# Patient Record
Sex: MALE | NOT HISPANIC OR LATINO | Employment: FULL TIME | URBAN - METROPOLITAN AREA
[De-identification: names, ages, dates, MRNs, and addresses within clinical notes are randomized per-mention and may not be internally consistent; named-entity substitution may affect disease eponyms.]

---

## 2017-02-26 ENCOUNTER — HOSPITAL ENCOUNTER (EMERGENCY)
Facility: HOSPITAL | Age: 27
Discharge: HOME | End: 2017-02-26
Attending: EMERGENCY MEDICINE | Admitting: EMERGENCY MEDICINE

## 2017-02-26 VITALS
DIASTOLIC BLOOD PRESSURE: 70 MMHG | TEMPERATURE: 97.6 F | WEIGHT: 150 LBS | RESPIRATION RATE: 16 BRPM | SYSTOLIC BLOOD PRESSURE: 115 MMHG | HEIGHT: 72 IN | HEART RATE: 58 BPM | OXYGEN SATURATION: 97 % | BODY MASS INDEX: 20.32 KG/M2

## 2017-02-26 DIAGNOSIS — R07.89 ATYPICAL CHEST PAIN: ICD-10-CM

## 2017-02-26 DIAGNOSIS — R55 VASOVAGAL SYNCOPE: Primary | ICD-10-CM

## 2017-02-26 LAB
ALBUMIN SERPL-MCNC: 4.3 G/DL (ref 3.2–4.8)
ALBUMIN/GLOB SERPL: 1.7 G/DL (ref 1.5–2.5)
ALP SERPL-CCNC: 52 U/L (ref 25–100)
ALT SERPL W P-5'-P-CCNC: 12 U/L (ref 7–40)
ANION GAP SERPL CALCULATED.3IONS-SCNC: 3 MMOL/L (ref 3–11)
AST SERPL-CCNC: 15 U/L (ref 0–33)
BASOPHILS # BLD AUTO: 0.03 10*3/MM3 (ref 0–0.2)
BASOPHILS NFR BLD AUTO: 0.3 % (ref 0–1)
BILIRUB SERPL-MCNC: 0.6 MG/DL (ref 0.3–1.2)
BUN BLD-MCNC: 11 MG/DL (ref 9–23)
BUN/CREAT SERPL: 12.2 (ref 7–25)
CALCIUM SPEC-SCNC: 9.1 MG/DL (ref 8.7–10.4)
CHLORIDE SERPL-SCNC: 102 MMOL/L (ref 99–109)
CO2 SERPL-SCNC: 31 MMOL/L (ref 20–31)
CREAT BLD-MCNC: 0.9 MG/DL (ref 0.6–1.3)
DEPRECATED RDW RBC AUTO: 43.3 FL (ref 37–54)
EOSINOPHIL # BLD AUTO: 0.16 10*3/MM3 (ref 0.1–0.3)
EOSINOPHIL NFR BLD AUTO: 1.5 % (ref 0–3)
ERYTHROCYTE [DISTWIDTH] IN BLOOD BY AUTOMATED COUNT: 12.2 % (ref 11.3–14.5)
GFR SERPL CREATININE-BSD FRML MDRD: 101 ML/MIN/1.73
GLOBULIN UR ELPH-MCNC: 2.6 GM/DL
GLUCOSE BLD-MCNC: 122 MG/DL (ref 70–100)
GLUCOSE BLDC GLUCOMTR-MCNC: 114 MG/DL (ref 70–130)
HCT VFR BLD AUTO: 41.8 % (ref 38.9–50.9)
HGB BLD-MCNC: 14.3 G/DL (ref 13.1–17.5)
HOLD SPECIMEN: NORMAL
HOLD SPECIMEN: NORMAL
IMM GRANULOCYTES # BLD: 0.02 10*3/MM3 (ref 0–0.03)
IMM GRANULOCYTES NFR BLD: 0.2 % (ref 0–0.6)
LYMPHOCYTES # BLD AUTO: 2.73 10*3/MM3 (ref 0.6–4.8)
LYMPHOCYTES NFR BLD AUTO: 25 % (ref 24–44)
MAGNESIUM SERPL-MCNC: 2 MG/DL (ref 1.3–2.7)
MCH RBC QN AUTO: 32.8 PG (ref 27–31)
MCHC RBC AUTO-ENTMCNC: 34.2 G/DL (ref 32–36)
MCV RBC AUTO: 95.9 FL (ref 80–99)
MONOCYTES # BLD AUTO: 0.85 10*3/MM3 (ref 0–1)
MONOCYTES NFR BLD AUTO: 7.8 % (ref 0–12)
NEUTROPHILS # BLD AUTO: 7.14 10*3/MM3 (ref 1.5–8.3)
NEUTROPHILS NFR BLD AUTO: 65.2 % (ref 41–71)
PLATELET # BLD AUTO: 182 10*3/MM3 (ref 150–450)
PMV BLD AUTO: 9.5 FL (ref 6–12)
POTASSIUM BLD-SCNC: 3.5 MMOL/L (ref 3.5–5.5)
PROT SERPL-MCNC: 6.9 G/DL (ref 5.7–8.2)
RBC # BLD AUTO: 4.36 10*6/MM3 (ref 4.2–5.76)
SODIUM BLD-SCNC: 136 MMOL/L (ref 132–146)
TROPONIN I SERPL-MCNC: 0.06 NG/ML (ref 0–0.07)
TROPONIN I SERPL-MCNC: 0.07 NG/ML (ref 0–0.07)
WBC NRBC COR # BLD: 10.93 10*3/MM3 (ref 3.5–10.8)
WHOLE BLOOD HOLD SPECIMEN: NORMAL
WHOLE BLOOD HOLD SPECIMEN: NORMAL

## 2017-02-26 PROCEDURE — 83735 ASSAY OF MAGNESIUM: CPT | Performed by: EMERGENCY MEDICINE

## 2017-02-26 PROCEDURE — 96374 THER/PROPH/DIAG INJ IV PUSH: CPT

## 2017-02-26 PROCEDURE — 25010000002 KETOROLAC TROMETHAMINE PER 15 MG: Performed by: EMERGENCY MEDICINE

## 2017-02-26 PROCEDURE — 82962 GLUCOSE BLOOD TEST: CPT

## 2017-02-26 PROCEDURE — 93005 ELECTROCARDIOGRAM TRACING: CPT | Performed by: EMERGENCY MEDICINE

## 2017-02-26 PROCEDURE — 80053 COMPREHEN METABOLIC PANEL: CPT | Performed by: EMERGENCY MEDICINE

## 2017-02-26 PROCEDURE — 85025 COMPLETE CBC W/AUTO DIFF WBC: CPT | Performed by: EMERGENCY MEDICINE

## 2017-02-26 PROCEDURE — 96361 HYDRATE IV INFUSION ADD-ON: CPT

## 2017-02-26 PROCEDURE — 84484 ASSAY OF TROPONIN QUANT: CPT

## 2017-02-26 PROCEDURE — 99285 EMERGENCY DEPT VISIT HI MDM: CPT

## 2017-02-26 RX ORDER — SODIUM CHLORIDE 0.9 % (FLUSH) 0.9 %
10 SYRINGE (ML) INJECTION AS NEEDED
Status: DISCONTINUED | OUTPATIENT
Start: 2017-02-26 | End: 2017-02-26 | Stop reason: HOSPADM

## 2017-02-26 RX ORDER — KETOROLAC TROMETHAMINE 15 MG/ML
15 INJECTION, SOLUTION INTRAMUSCULAR; INTRAVENOUS ONCE
Status: COMPLETED | OUTPATIENT
Start: 2017-02-26 | End: 2017-02-26

## 2017-02-26 RX ADMIN — KETOROLAC TROMETHAMINE 15 MG: 15 INJECTION, SOLUTION INTRAMUSCULAR; INTRAVENOUS at 06:24

## 2017-02-26 RX ADMIN — SODIUM CHLORIDE 1000 ML: 9 INJECTION, SOLUTION INTRAVENOUS at 06:22

## 2018-07-05 ENCOUNTER — HOSPITAL ENCOUNTER (EMERGENCY)
Facility: HOSPITAL | Age: 28
Discharge: HOME | End: 2018-07-05
Attending: EMERGENCY MEDICINE | Admitting: EMERGENCY MEDICINE

## 2018-07-05 VITALS
SYSTOLIC BLOOD PRESSURE: 140 MMHG | TEMPERATURE: 97.8 F | WEIGHT: 150 LBS | RESPIRATION RATE: 16 BRPM | HEART RATE: 92 BPM | HEIGHT: 72 IN | OXYGEN SATURATION: 97 % | BODY MASS INDEX: 20.32 KG/M2 | DIASTOLIC BLOOD PRESSURE: 73 MMHG

## 2018-07-05 DIAGNOSIS — L03.90 CELLULITIS, UNSPECIFIED CELLULITIS SITE: Primary | ICD-10-CM

## 2018-07-05 PROCEDURE — 99283 EMERGENCY DEPT VISIT LOW MDM: CPT

## 2018-07-05 RX ORDER — DOXYCYCLINE 100 MG/1
100 CAPSULE ORAL 2 TIMES DAILY
Qty: 20 CAPSULE | Refills: 0 | Status: SHIPPED | OUTPATIENT
Start: 2018-07-05 | End: 2018-07-15

## 2018-07-05 NOTE — DISCHARGE INSTRUCTIONS
Clean once daily with antibacterial soap.  Follow-up with below stated doctor or your doctor if no improvement in 2 days.

## 2018-07-05 NOTE — ED PROVIDER NOTES
Subjective   28-year-old white male complaining of insect bites.  Patient states that for the past 3 days he has noticed small dots on his lower extremities.  The one he is concerned about is near his inguinal area.  This particular area has more erythema and is a little more tender than the others.  Patient denies any tick bites he does admit being outdoors before this occurred.  Patient denies any fever, chills, other rash or other complaints.        Rash   Location:  Leg  Leg rash location:  R upper leg  Quality: itchiness    Onset quality:  Sudden  Timing:  Constant  Chronicity:  New  Context: insect bite/sting    Relieved by:  Nothing  Worsened by:  Nothing  Ineffective treatments:  Antibiotic cream  Associated symptoms: no fever, no headaches and no myalgias        Review of Systems   Constitutional: Negative for fever.   Musculoskeletal: Negative for myalgias.   Skin: Positive for rash.   Neurological: Negative for headaches.   All other systems reviewed and are negative.      History reviewed. No pertinent past medical history.    Allergies   Allergen Reactions   • Amoxicillin    • Ceclor [Cefaclor]    • Ciprofloxacin    • Penicillins        Past Surgical History:   Procedure Laterality Date   • ARM LACERATION REPAIR Right    • EYE SURGERY         History reviewed. No pertinent family history.    Social History     Social History   • Marital status: Single     Social History Main Topics   • Smoking status: Current Every Day Smoker     Packs/day: 1.00   • Alcohol use Yes      Comment: rarely   • Drug use: No     Other Topics Concern   • Not on file           Objective   Physical Exam   Constitutional: He appears well-developed and well-nourished.   Eyes: Conjunctivae are normal.   Pulmonary/Chest: Effort normal. He has no wheezes.   Neurological: He is alert.   Skin: Capillary refill takes less than 2 seconds.   1 cm circular lesion with ring appearance. This is located at the inner right thigh.    The lower  extremities otherwise show small erythematous papular lesions numbering approximately 5 on the right left lower extremity.  The remainder of the skin including the back normal.   Psychiatric: He has a normal mood and affect. His behavior is normal.   Nursing note and vitals reviewed.      Procedures           ED Course                  MDM      Final diagnoses:   Cellulitis, unspecified cellulitis site            MERON Crystal  07/05/18 1247

## 2019-04-10 ENCOUNTER — HOSPITAL ENCOUNTER (EMERGENCY)
Facility: HOSPITAL | Age: 29
Discharge: HOME | End: 2019-04-10
Attending: EMERGENCY MEDICINE | Admitting: EMERGENCY MEDICINE

## 2019-04-10 VITALS
WEIGHT: 150 LBS | RESPIRATION RATE: 16 BRPM | HEART RATE: 69 BPM | OXYGEN SATURATION: 100 % | DIASTOLIC BLOOD PRESSURE: 84 MMHG | HEIGHT: 72 IN | TEMPERATURE: 98 F | BODY MASS INDEX: 20.32 KG/M2 | SYSTOLIC BLOOD PRESSURE: 130 MMHG

## 2019-04-10 DIAGNOSIS — S80.01XA CONTUSION OF RIGHT KNEE, INITIAL ENCOUNTER: Primary | ICD-10-CM

## 2019-04-10 PROCEDURE — 99283 EMERGENCY DEPT VISIT LOW MDM: CPT

## 2019-04-10 RX ORDER — TRAMADOL HYDROCHLORIDE 50 MG/1
50 TABLET ORAL EVERY 8 HOURS PRN
Qty: 10 TABLET | Refills: 0 | Status: SHIPPED | OUTPATIENT
Start: 2019-04-10 | End: 2019-04-26

## 2019-04-10 NOTE — ED PROVIDER NOTES
Subjective   Fazal Munoz is a 29 y.o.male who presents to the ED with complaints of right knee pain. The patient reports he injured his right knee while he was skateboarding last night. He has not taken any medication for his pain. He denies sustaining any other injuries during the fall. Additionally, he has been ambulatory since the fall. Furthermore, he was evaluated at the urgent treatment center earlier today before he was sent here for further evaluation. There are no other complaints at this time.         History provided by:  Patient  Lower Extremity Issue   Location:  Knee  Time since incident:  1 day  Injury: yes    Mechanism of injury: fall    Fall:     Fall occurred: skateboarding.    Impact surface:  Hard floor    Entrapped after fall: no    Knee location:  R knee  Pain details:     Quality:  Aching    Radiates to:  Does not radiate    Severity:  Moderate    Onset quality:  Sudden    Duration:  1 day    Timing:  Constant    Progression:  Unchanged  Chronicity:  New  Dislocation: no    Foreign body present:  No foreign bodies  Prior injury to area:  No  Relieved by:  None tried  Worsened by:  Nothing  Ineffective treatments:  None tried  Associated symptoms: no back pain and no neck pain        Review of Systems   Cardiovascular: Negative for chest pain.   Gastrointestinal: Negative for abdominal pain.   Musculoskeletal: Positive for arthralgias (right knee). Negative for back pain and neck pain.   Neurological: Negative for headaches.   All other systems reviewed and are negative.      History reviewed. No pertinent past medical history.    Allergies   Allergen Reactions   • Amoxicillin    • Ceclor [Cefaclor]    • Ciprofloxacin    • Penicillins        Past Surgical History:   Procedure Laterality Date   • ARM LACERATION REPAIR Right    • EYE SURGERY         History reviewed. No pertinent family history.    Social History     Socioeconomic History   • Marital status: Single     Spouse name: Not on  file   • Number of children: Not on file   • Years of education: Not on file   • Highest education level: Not on file   Tobacco Use   • Smoking status: Current Every Day Smoker     Packs/day: 1.00   Substance and Sexual Activity   • Alcohol use: Yes     Comment: rarely   • Drug use: Yes     Types: Marijuana     Comment: smoked today   • Sexual activity: Defer         Objective   Physical Exam   Constitutional: He is oriented to person, place, and time. He appears well-developed and well-nourished. No distress.   HENT:   Head: Normocephalic and atraumatic.   Nose: Nose normal.   Eyes: Conjunctivae are normal. No scleral icterus.   Neck: Normal range of motion. Neck supple.   Pulmonary/Chest: Effort normal. No respiratory distress.   Musculoskeletal: Normal range of motion. He exhibits edema.   Soft tissue swelling over the proximal tibial plateau. Range of motion of the knee is full, but it reproduces his presenting pain. No laxity x4. Proximal and distal joints are negative.    Neurological: He is alert and oriented to person, place, and time.   Skin: Skin is warm and dry. No erythema.   Psychiatric: He has a normal mood and affect. His behavior is normal.   Nursing note and vitals reviewed.      Procedures         ED Course  ED Course as of Apr 10 1609   Wed Apr 10, 2019   1556  radiology reading from 1144 this morning showing a negative x-ray of the right knee for any acute findings.   [TB]      ED Course User Index  [TB] Og Montero     No results found for this or any previous visit (from the past 24 hour(s)).  Note: In addition to lab results from this visit, the labs listed above may include labs taken at another facility or during a different encounter within the last 24 hours. Please correlate lab times with ED admission and discharge times for further clarification of the services performed during this visit.    No orders to display     Vitals:    04/10/19 1453 04/10/19 1609   BP: 143/80 130/84  "  BP Location: Left arm    Patient Position: Sitting    Pulse: 80 69   Resp: 14 16   Temp: 98 °F (36.7 °C)    TempSrc: Oral    SpO2: 96% 100%   Weight: 68 kg (150 lb)    Height: 182.9 cm (72\")      Medications - No data to display  ECG/EMG Results (last 24 hours)     ** No results found for the last 24 hours. **        No orders to display                       MDM    Final diagnoses:   Contusion of right knee, initial encounter       Documentation assistance provided by brianna Montero.  Information recorded by the brianna was done at my direction and has been verified and validated by me.     Og Montero  04/10/19 1609       Emily Nguyen APRN  04/10/19 1610    "

## 2019-04-10 NOTE — DISCHARGE INSTRUCTIONS
Use the Ace wrap and crutches without exception for 3 full days, starting today.  Check your ability to function at baseline without the crutches on Sunday morning.  If pain persist, follow-up with the orthopedic office of Dr. Farris whose name, address and phone number is provided.  Use Motrin 3 times a day for 3 days then just as needed.  Use the prescription pain medication for that pain at breakthrough Motrin.  Use ice every 2 hours for 20 minutes during your waking hours.  Thank you

## 2019-04-26 ENCOUNTER — OFFICE VISIT (OUTPATIENT)
Dept: ORTHOPEDIC SURGERY | Facility: CLINIC | Age: 29
End: 2019-04-26

## 2019-04-26 VITALS — BODY MASS INDEX: 20.31 KG/M2 | HEART RATE: 108 BPM | WEIGHT: 149.91 LBS | OXYGEN SATURATION: 98 % | HEIGHT: 72 IN

## 2019-04-26 DIAGNOSIS — S83.521A: ICD-10-CM

## 2019-04-26 DIAGNOSIS — S80.01XA CONTUSION OF RIGHT KNEE, INITIAL ENCOUNTER: Primary | ICD-10-CM

## 2019-04-26 PROCEDURE — 99203 OFFICE O/P NEW LOW 30 MIN: CPT | Performed by: ORTHOPAEDIC SURGERY

## 2019-04-26 NOTE — PROGRESS NOTES
[facility] [place] [facility] Note    Subjective     Chief Complaint   Patient presents with   • Right Knee - Pain     Contusion of Right Knee        HPI      Fazal Munoz is a 29 y.o. male.  He fell on his right knee skating on April 16.  He landed directly on his right anterior tibia.  Pain is 6 out of 10.  He tried ibuprofen.  His knee feels loose and unstable.        No past medical history on file.   Past Surgical History:   Procedure Laterality Date   • ARM LACERATION REPAIR Right    • EYE SURGERY        No family history on file.  Social History     Socioeconomic History   • Marital status: Single     Spouse name: Not on file   • Number of children: Not on file   • Years of education: Not on file   • Highest education level: Not on file   Tobacco Use   • Smoking status: Current Every Day Smoker     Packs/day: 1.00   Substance and Sexual Activity   • Alcohol use: Yes     Comment: rarely   • Drug use: Yes     Types: Marijuana     Comment: smoked today   • Sexual activity: Defer      Current Outpatient Medications on File Prior to Visit   Medication Sig Dispense Refill   • [DISCONTINUED] traMADol (ULTRAM) 50 MG tablet Take 1 tablet by mouth Every 8 (Eight) Hours As Needed for Moderate Pain . 10 tablet 0     No current facility-administered medications on file prior to visit.       Allergies   Allergen Reactions   • Amoxicillin    • Ceclor [Cefaclor]    • Ciprofloxacin    • Penicillins         The following portions of the patient's history were reviewed and updated as appropriate: allergies, current medications, past family history, past medical history, past social history, past surgical history and problem list.    Review of Systems   Constitutional: Negative.    HENT: Negative.    Eyes: Negative.    Respiratory: Negative.    Cardiovascular: Negative.    Gastrointestinal: Negative.    Endocrine: Negative.    Genitourinary: Negative.    Musculoskeletal: Positive for joint swelling.        Joint Pain   "  Skin: Negative.    Allergic/Immunologic: Negative.    Neurological: Negative.    Hematological: Negative.    Psychiatric/Behavioral: Negative.         Objective      Physical Exam  Pulse 108   Ht 182.9 cm (72.01\")   Wt 68 kg (149 lb 14.6 oz)   SpO2 98%   BMI 20.33 kg/m²     Body mass index is 20.33 kg/m².        GENERAL APPEARANCE: awake, alert & oriented x 3, in no acute distress and well developed, well nourished  PSYCH: normal mood and affect  LUNGS:  breathing nonlabored, no wheezing  EYES: sclera anicteric, pupils equal  CARDIOVASCULAR: palpable pulses dorsalis pedis, palpable posterior tibial bilaterally. Capillary refill less than 2 seconds  INTEGUMENTARY: skin intact, no clubbing, cyanosis  NEUROLOGIC:  Normal Sensation and reflexes             Ortho Exam  Peripheral Vascular:    Upper Extremity:   Inspection:  Left--no cyanotic nail beds Right--no cyanotic nail beds   Bilateral:  Pink nail beds with brisk capillary refill   Palpation:  Bilateral radial pulse normal  Musculoskeletal:  Global Assessment:  Overall assessment of Lower Extremity Muscle Strength and Tone:  Right quadriceps--5/5  Right hamstrings--5/5  Right tibialis anterior--5/5  Right gastroc soleus--5/5  Right EHL--5/5  Lower Extremity:  Knee/Patella:  No digital clubbing or cyanosis.    Examination of right knee reveals:  Normal deep tendon reflexes, coordination, strength, tone, sensation.  No known fractures or deformities.  Inspection and Palpation:    Right knee:  Tenderness:  none  Effusion: Trace  Crepitus:  none  Pulses:  2+  Ecchymosis:  None  Warmth:  None   ROM:  Right:  Extension:0    Flexion:135  Left:  Extension:0     Flexion:135  Instability:  Right:  Lachman Test:  Negative, Varus stress test negative,   Valgus stress test negative, Posterior Drawer Test: Positive and 1+ deformities/Malalignments/Discrepancies:    Left:  none  Right:  none  Functional Testing:  Right:  Mali's test:  Negative  Patella grind test:  " Negative  Q-angle:  Normal  Apprehension Sign:  Negative        Imaging/Studies  Imaging Results (last 7 days)     Procedure Component Value Units Date/Time    XR Knee 3+ View With [place] Right [[id number]] Resulted:  04/26/19 0933     Updated:  04/26/19 0934    Narrative:       Knee X-Ray  Indication: Pain    Upright AP of bilateral knees. Lateral, skiers and Sunrise views of right   knee     Findings:  No fracture  No bony lesion  Normal soft tissues  Normal joint spaces    No prior studies were available for comparison.            Assessment/Plan        ICD-10-CM ICD-9-CM   1. Contusion of right knee, initial encounter S80.01XA 924.11   2. Tear of knee, posterior cruciate ligament, right, initial encounter S83.521A 844.2       Orders Placed This Encounter   Procedures   • XR Knee 3+ View With [place] Right   • MRI Knee Right Without Contrast      The plan will be a knee brace and an MRI to evaluate the severity of his posterior cruciate ligament tear.  I will see him back after the MRI.    Medical Decision Making  Management Options : over-the-counter medicine  Data/Risk: radiology tests and independent visualization of imaging, lab tests, or EMG/NCV    Wilfredo Farris MD  04/26/19  9:39 AM         EMR Dragon/Transcription disclaimer:  Much of this encounter note is an electronic transcription of spoken language to printed text. Electronic transcription of spoken language may permit erroneous, or at times, nonsensical words or phrases to be inadvertently transcribed. Although I have reviewed the note for such errors, some may still exist.

## 2019-05-01 ENCOUNTER — APPOINTMENT (OUTPATIENT)
Dept: MRI IMAGING | Facility: HOSPITAL | Age: 29
End: 2019-05-01

## 2019-05-01 ENCOUNTER — HOSPITAL ENCOUNTER (OUTPATIENT)
Dept: MRI IMAGING | Facility: HOSPITAL | Age: 29
Discharge: HOME | End: 2019-05-01
Admitting: ORTHOPAEDIC SURGERY

## 2019-05-01 DIAGNOSIS — S83.521A: ICD-10-CM

## 2019-05-01 PROCEDURE — 73721 MRI JNT OF LWR EXTRE W/O DYE: CPT

## 2019-05-10 ENCOUNTER — OFFICE VISIT (OUTPATIENT)
Dept: ORTHOPEDIC SURGERY | Facility: CLINIC | Age: 29
End: 2019-05-10

## 2019-05-10 VITALS — WEIGHT: 160.05 LBS | HEART RATE: 102 BPM | BODY MASS INDEX: 21.68 KG/M2 | HEIGHT: 72 IN | OXYGEN SATURATION: 99 %

## 2019-05-10 DIAGNOSIS — S83.521D RUPTURE OF POSTERIOR CRUCIATE LIGAMENT OF RIGHT KNEE, SUBSEQUENT ENCOUNTER: Primary | ICD-10-CM

## 2019-05-10 PROCEDURE — 99213 OFFICE O/P EST LOW 20 MIN: CPT | Performed by: ORTHOPAEDIC SURGERY

## 2019-05-10 NOTE — PROGRESS NOTES
[facility] [place] [facility] [place]    Subjective     Chief Complaint   Patient presents with   • Follow-up     MRI Right Knee 5/1/19        HPI      Fazal Munoz is a 29 y.o. male.  He is follow-up after the MRI of the right knee.  He injured his knee April 16.  He is in line skater.  Pain is 4 out of 10.        History reviewed. No pertinent past medical history.   Past Surgical History:   Procedure Laterality Date   • ARM LACERATION REPAIR Right    • EYE SURGERY        History reviewed. No pertinent family history.  Social History     Socioeconomic History   • Marital status: Single     Spouse name: Not on file   • Number of children: Not on file   • Years of education: Not on file   • Highest education level: Not on file   Tobacco Use   • Smoking status: Current Every Day Smoker     Packs/day: 1.00   Substance and Sexual Activity   • Alcohol use: Yes     Comment: rarely   • Drug use: Yes     Types: Marijuana     Comment: smoked today   • Sexual activity: Defer      No current outpatient medications on file prior to visit.     No current facility-administered medications on file prior to visit.       Allergies   Allergen Reactions   • Amoxicillin    • Ceclor [Cefaclor]    • Ciprofloxacin    • Penicillins         The following portions of the patient's history were reviewed and updated as appropriate: allergies, current medications, past family history, past medical history, past social history, past surgical history and problem list.    Review of Systems   Constitutional: Negative.    HENT: Negative.    Eyes: Negative.    Respiratory: Negative.    Cardiovascular: Negative.    Gastrointestinal: Negative.    Endocrine: Negative.    Genitourinary: Negative.    Musculoskeletal: Positive for arthralgias (knee pain).   Skin: Negative.    Allergic/Immunologic: Negative.    Neurological: Negative.    Hematological: Negative.    Psychiatric/Behavioral: Negative.         Objective      Physical Exam  Pulse 102    "Ht 182.9 cm (72.01\")   Wt 72.6 kg (160 lb 0.9 oz)   SpO2 99%   BMI 21.70 kg/m²     Body mass index is 21.7 kg/m².        GENERAL APPEARANCE: awake, alert & oriented x 3, in no acute distress and well developed, well nourished  PSYCH: normal mood and affect  LUNGS:  breathing nonlabored, no wheezing      Ortho Exam  Peripheral Vascular:    Upper Extremity:   Inspection:  Left--no cyanotic nail beds Right--no cyanotic nail beds   Bilateral:  Pink nail beds with brisk capillary refill   Palpation:  Bilateral radial pulse normal  Musculoskeletal:  Global Assessment:  Overall assessment of Lower Extremity Muscle Strength and Tone:  Right quadriceps--5/5  Right hamstrings--5/5  Right tibialis anterior--5/5  Right gastroc soleus--5/5  Right EHL--5/5  Lower Extremity:  Knee/Patella:  No digital clubbing or cyanosis.    Examination of right knee reveals:  Normal deep tendon reflexes, coordination, strength, tone, sensation.  No known fractures or deformities.  Inspection and Palpation:    Right knee:  Tenderness:  none  Effusion:  none  Crepitus:  none  Pulses:  2+  Ecchymosis:  None  Warmth:  None   ROM:  Right:  Extension:0    Flexion:135  Left:  Extension:0     Flexion:135  Instability:  Right:  Lachman Test:  Negative, Varus stress test negative,   Valgus stress test negative, Posterior Drawer Test: Positive  Deformities/Malalignments/Discrepancies:    Left:  none  Right:  none  Functional Testing:  Right:  Mali's test:  Negative  Patella grind test:  Negative  Q-angle:  Normal  Apprehension Sign:  Negative        Imaging/Studies  Imaging Results (last 7 days)     ** No results found for the last 168 hours. **      I viewed his MRI from May 1 which shows a complete PCL tear with joint effusion.    Assessment/Plan        ICD-10-CM ICD-9-CM   1. Rupture of posterior cruciate ligament of right knee, subsequent encounter S83.521D V58.89     844.2       Orders Placed This Encounter   Procedures   • Ambulatory Referral " to Physical Therapy      Plan to be physical therapy and we will get him a PCL brace.  He will continue hinged knee sleeve in the meantime.  I will see him back in a month.  We went over his restrictions and treatment plan.    Medical Decision Making  Management Options : over-the-counter medicine and physical/occupational therapy  Data/Risk: radiology tests and independent visualization of imaging, lab tests, or EMG/NCV    Wilfredo Farris MD  05/10/19  9:13 AM         EMR Dragon/Transcription disclaimer:  Much of this encounter note is an electronic transcription of spoken language to printed text. Electronic transcription of spoken language may permit erroneous, or at times, nonsensical words or phrases to be inadvertently transcribed. Although I have reviewed the note for such errors, some may still exist.

## 2019-05-23 ENCOUNTER — HOSPITAL ENCOUNTER (OUTPATIENT)
Dept: PHYSICAL THERAPY | Facility: HOSPITAL | Age: 29
Setting detail: THERAPIES SERIES
Discharge: HOME | End: 2019-05-23

## 2019-05-23 DIAGNOSIS — S83.521A RUPTURE OF POSTERIOR CRUCIATE LIGAMENT OF RIGHT KNEE, INITIAL ENCOUNTER: Primary | ICD-10-CM

## 2019-05-23 PROCEDURE — 97161 PT EVAL LOW COMPLEX 20 MIN: CPT | Performed by: PHYSICAL THERAPIST

## 2019-05-23 NOTE — THERAPY EVALUATION
Outpatient Physical Therapy Ortho Initial Evaluation  [facility]     Patient Name: Fazal Munoz  : 1990  MRN: [id number]  Today's Date: 2019      Visit Date: 2019    There is no problem list on file for this patient.       History reviewed. No pertinent past medical history.     Past Surgical History:   Procedure Laterality Date   • ARM LACERATION REPAIR Right    • EYE SURGERY         Visit Dx:     ICD-10-CM ICD-9-CM   1. Rupture of posterior cruciate ligament of right knee, initial encounter S83.521A 844.2         Patient History     Row Name 19 1300             History    Chief Complaint  Difficulty with daily activities;Pain;Joint swelling  -RB      Type of Pain  Knee pain  -RB      Brief Description of Current Complaint  Pt reports that he injured his R knee while skating on . He landed directly on his R knee with his knee in a bent position. He felt a pop, had immediate pn and difficulty WBing. Had MRI showing PCL rupture. Over the past couple of weeks his pn, swelling, and mobility have greatly improved. However he continues to have some tightness in the knee limiting full flexion, and has not yet returned to skating. His goal for PT is to return to performing skating tricks, which includes deep single leg squats. He also notes mild difficulty w/ descending stairs.  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 Hobbies include video games and skating.  -RB      How has patient tried to help current problem?  rest, ice  -RB      What clinical tests have you had for this problem?  MRI  -RB      Results of Clinical Tests  rupture of PCL  -RB          Pain     Pain Location  Knee  -RB      Pain at Present  0  -RB      Pain at Best  0  -RB      Pain at Worst  1  -RB      Pain Frequency  Intermittent  -RB      Pain Description  Shooting;Sore  -RB      What Performance Factors Make the Current Problem(s) WORSE?  deep knee flexion, squatting  -RB      What Performance Factors Make the Current Problem(s) BETTER?  rest, ice  -RB      Is your sleep disturbed?  No  -RB         Fall Risk Assessment    Any falls in the past year:  Yes  -RB      Number of falls reported in the last 12 months  1  -RB      Factors that contributed to the fall:  Tripped  -RB         Daily Activities    Primary Language  English  -RB      How does patient learn best?  Demonstration  -RB      Teaching needs identified  Home Exercise Program;Management of Condition  -RB      Patient is concerned about/has problems with  Flexibility;Performing home management (household chores, shopping, care of dependents);Performing job responsibilities/community activities (work, school,;Performing sports, recreation, and play activities  -RB      Does patient have problems with the following?  None  -RB      Barriers to learning  None  -RB      Pt Participated in POC and Goals  Yes  -RB         Safety    Are you being hurt, hit, or frightened by anyone at home or in your life?  No  -RB      Are you being neglected by a caregiver  No  -RB        User Key  (r) = Recorded By, (t) = Taken By, (c) = Cosigned By    Initials Name Provider Type    RB Silvia Kraft PT Physical Therapist          PT Ortho     Row Name 05/23/19 1300       Posture/Observations    Posture/Observations Comments  Pt ambulates independently and w/o apparent deviation. No edema noted. He navigates stairs reciprocally and w/o deviation but does subjectively note mild instability w/ descending.  -RB       Special Tests/Palpation    Special Tests/Palpation  Knee  -RB       Knee Palpation    Knee Palpation?  Yes  -RB    Posterior Joint Line   Right:;Tender  -RB       Knee (Tibiofemoral) Accessory Motions    Knee (Tibiofemoral) Accessory Motions Tested?  Yes  -RB    Posterior glide of tibia on femur  Right:;Hypermobile  -RB       Knee Special Tests    Anterior drawer (ACL lesion)  Negative  -RB    Posterior drawer (PCL lesion)  Right:;Positive  -RB    Valgus stress (MCL lesion)  Negative  -RB    Varus stress (LCL lesion)  Negative  -RB    Thessaly test (meniscal lesion)  Negative  -RB    Knee Special Tests Comments  Pn w/ deep squat. Peforms eccentric step down test w/ minimal deviation/lack eccentric control. Reports instability/weakness with single leg RDL on R.  -RB       General ROM    RT Lower Ext  Rt Knee Extension/Flexion  -RB    LT Lower Ext  Lt Knee Extension/Flexion  -RB       Right Lower Ext    Rt Knee Extension/Flexion AROM  2-0-140  -RB       Left Lower Ext    Lt Knee Extension/Flexion AROM  2-0-150  -RB       MMT (Manual Muscle Testing)    Rt Lower Ext  Rt Hip Flexion;Rt Hip Extension;Rt Hip ABduction;Rt Knee Extension;Rt Knee Flexion  -RB    Lt Lower Ext  Lt Hip Flexion;Lt Hip Extension;Lt Hip ABduction;Lt Knee Extension;Lt Knee Flexion  -RB       MMT Right Lower Ext    Rt Hip Flexion MMT, Gross Movement  (5/5) normal  -RB    Rt Hip Extension MMT, Gross Movement  (5/5) normal  -RB    Rt Hip ABduction MMT, Gross Movement  (5/5) normal  -RB    Rt Knee Extension MMT, Gross Movement  (5/5) normal  -RB    Rt Knee Flexion MMT, Gross Movement  (5/5) normal mild discomfort post knee  -RB       MMT Left Lower Ext    Lt Hip Flexion MMT, Gross Movement  (5/5) normal  -RB    Lt Hip Extension MMT, Gross Movement  (5/5) normal  -RB    Lt Hip ABduction MMT, Gross Movement  (5/5) normal  -RB    Lt Knee Extension MMT, Gross Movement  (5/5) normal  -RB    Lt Knee Flexion MMT, Gross Movement  (5/5) normal  -RB       Balance Skills Training    SLS  intact  -RB      User Key  (r) = Recorded By, (t) = Taken By, (c) = Cosigned By    Initials Name Provider Type     Silvia Duenas PT Physical Therapist                      Therapy Education  Education Details: issued initial HEP including front lunge w/ trunk rotation, multidirectional lunges; discussed avoiding skating tricks or high impact activity at this time, continued use of ice for pn/swelling  Given: HEP, Pain management  Program: New  How Provided: Verbal, Demonstration, Written  Provided to: Patient  Level of Understanding: Teach back education performed     PT OP Goals     Row Name 05/23/19 1443 05/23/19 1400       PT Short Term Goals    STG Date to Achieve  --  07/04/19  -RB    STG 1  --  Pt to be independent w/ long term HEP and self mgmt  -RB    STG 1 Progress  --  New  -RB    STG 2  --  Pt to report no pn or limitations w/ work duties.  -RB    STG 2 Progress  --  New  -RB    STG 3  --  Pt to demo R knee flxn ROM of 150 deg.  -RB    STG 3 Progress  --  New  -RB    STG 4  --  Pt to perform single leg squat through full ROM w/o pn.  -RB    STG 4 Progress  --  New  -RB    STG 5  --  Pt to improve LEFS score to 63/80 to reflect improved pn and function.  -RB    STG 5 Progress  --  New  -RB       Time Calculation    PT Goal Re-Cert Due Date  08/21/19  -RB  08/21/19  -RB      User Key  (r) = Recorded By, (t) = Taken By, (c) = Cosigned By    Initials Name Provider Type    RB Silvia Kraft, PT Physical Therapist          PT Assessment/Plan     Row Name 05/23/19 1440          PT Assessment    Functional Limitations  Impaired locomotion;Limitation in home management;Limitations in community activities;Limitations in functional capacity and performance;Performance in leisure activities;Performance in sport activities;Performance in work activities;Performance in self-care ADL  -RB     Impairments  Muscle strength;Range of motion;Pain;Joint integrity  -RB     Assessment Comments  Pt presents w/ evolving symptoms of low complexity consistent w/ diagnosis of R PCL tear. He demonstrates mild deficits in R knee flxn ROM,  joint stability, and strength limiting his ability to perform daily and work activities.  He would benefit from skilled PT services to address deficits and return to PLOF.  -RB     Please refer to paper survey for additional self-reported information  Yes  -RB     Rehab Potential  Excellent  -RB     Patient/caregiver participated in establishment of treatment plan and goals  Yes  -RB     Patient would benefit from skilled therapy intervention  Yes  -RB        PT Plan    PT Frequency  1x/week  -RB     Predicted Duration of Therapy Intervention (Therapy Eval)  6-8 visits  -RB     Planned CPT's?  PT EVAL LOW COMPLEXITY: 81749;PT RE-EVAL: 88116;PT THER PROC EA 15 MIN: 63222;PT MANUAL THERAPY EA 15 MIN: 96466;PT NEUROMUSC RE-EDUCATION EA 15 MIN: 03308;PT HOT/COLD PACK WC NONMCARE: 38863;PT ELECTRICAL STIM UNATTEND:   -RB     PT Plan Comments  PT POC to include higher level hip and knee strengthening w/ progression to single leg and plyometric activities as tolerated, manual therapy techniques, modalities as indicated for pn control.  -RB       User Key  (r) = Recorded By, (t) = Taken By, (c) = Cosigned By    Initials Name Provider Type    RB Silvia Kraft, PT Physical Therapist                              Outcome Measure Options: Lower Extremity Functional Scale (LEFS)  Lower Extremity Functional Index  Any of your usual work, housework or school activities: Moderate difficulty  Your usual hobbies, recreational or sporting activities: Quite a bit of difficulty  Getting into or out of the bath: No difficulty  Walking between rooms: No difficulty  Putting on your shoes or socks: A little bit of difficulty  Squatting: Quite a bit of difficulty  Lifting an object, like a bag of groceries from the floor: No difficulty  Performing light activities around your home: No difficulty  Performing heavy activities around your home: A little bit of difficulty  Getting into or out of a car: No difficulty  Walking 2 blocks: No  difficulty  Walking a mile: A little bit of difficulty  Going up or down 10 stairs (about 1 flight of stairs): A little bit of difficulty  Standing for 1 hour: A little bit of difficulty  Sitting for 1 hour: No difficulty  Running on even ground: Quite a bit of difficulty  Running on uneven ground: Quite a bit of difficulty  Making sharp turns while running fast: Quite a bit of difficulty  Hopping: Quite a bit of difficulty  Rolling over in bed: No difficulty  Total: 55      Time Calculation:     Start Time: 1345     Therapy Charges for Today     Code Description Service Date Service Provider Modifiers Qty    37628427651 HC PT EVAL LOW COMPLEXITY 3 5/23/2019 Silvia Kraft, PT GP 1          PT G-Codes  Outcome Measure Options: Lower Extremity Functional Scale (LEFS)  Total: 55         Silvia Kraft, PT  5/23/2019

## 2019-05-30 ENCOUNTER — HOSPITAL ENCOUNTER (OUTPATIENT)
Dept: PHYSICAL THERAPY | Facility: HOSPITAL | Age: 29
Setting detail: THERAPIES SERIES
Discharge: HOME | End: 2019-05-30

## 2019-05-30 DIAGNOSIS — S83.521A RUPTURE OF POSTERIOR CRUCIATE LIGAMENT OF RIGHT KNEE, INITIAL ENCOUNTER: Primary | ICD-10-CM

## 2019-05-30 PROCEDURE — 97110 THERAPEUTIC EXERCISES: CPT | Performed by: PHYSICAL THERAPIST

## 2019-05-30 NOTE — THERAPY TREATMENT NOTE
Outpatient Physical Therapy Ortho Treatment Note  [facility]     Patient Name: Fazal Munoz  : 1990  MRN: [id number]  Today's Date: 2019      Visit Date: 2019    Visit Dx:    ICD-10-CM ICD-9-CM   1. Rupture of posterior cruciate ligament of right knee, initial encounter S83.521A 844.2       There is no problem list on file for this patient.       No past medical history on file.     Past Surgical History:   Procedure Laterality Date   • ARM LACERATION REPAIR Right    • EYE SURGERY         PT Ortho     Row Name 19 1600       Subjective Comments    Subjective Comments  Pt states that he has had no pn over the past week. HEP going well.  -RB       Subjective Pain    Able to rate subjective pain?  yes  -RB    Pre-Treatment Pain Level  0  -RB    Post-Treatment Pain Level  0  -RB      User Key  (r) = Recorded By, (t) = Taken By, (c) = Cosigned By    Initials Name Provider Type    Silvia Duenas, PT Physical Therapist                      PT Assessment/Plan     Row Name 19 1618          PT Assessment    Assessment Comments  Pt tolerating initial HEP and clinic exercise regimen well. Today's tx focused on CKC strengthening and single leg stability, which he was able to perform w/o any pn. He required cues for technique, namely for hip /knee alignment during lunge exercises.  -RB        PT Plan    PT Plan Comments  cont per current POc- progress as pt tolerates  -RB       User Key  (r) = Recorded By, (t) = Taken By, (c) = Cosigned By    Initials Name Provider Type    Silvia Duenas, PT Physical Therapist            Exercises     Row Name 19 1600             Subjective Comments    Subjective Comments  Pt states that he has had no pn over the past week. HEP going well.  -RB         Subjective Pain    Able to rate subjective pain?  yes  -RB      Pre-Treatment Pain Level  0  -RB      Post-Treatment Pain Level  0  -RB         Total Minutes    42250 - PT Therapeutic Exercise  Minutes  25  -RB         Exercise 1    Exercise Name 1  Ther ex in clinic per flow sheet w/ focus on CKC strengthening, single leg stability.   -RB        User Key  (r) = Recorded By, (t) = Taken By, (c) = Cosigned By    Initials Name Provider Type    Silvia Duenas PT Physical Therapist                       PT OP Goals     Row Name 05/30/19 1620          Time Calculation    PT Goal Re-Cert Due Date  08/21/19  -RB       User Key  (r) = Recorded By, (t) = Taken By, (c) = Cosigned By    Initials Name Provider Type    Silvia Duenas PT Physical Therapist                         Time Calculation:   Start Time: 1540  Therapy Charges for Today     Code Description Service Date Service Provider Modifiers Qty    39601137606 HC PT THER PROC EA 15 MIN 5/30/2019 Silvia Kraft PT GP 2                    Silvia Kraft, PT  5/30/2019

## 2019-06-03 ENCOUNTER — HOSPITAL ENCOUNTER (OUTPATIENT)
Dept: PHYSICAL THERAPY | Facility: HOSPITAL | Age: 29
Setting detail: THERAPIES SERIES
Discharge: HOME | End: 2019-06-03

## 2019-06-03 DIAGNOSIS — S83.521A RUPTURE OF POSTERIOR CRUCIATE LIGAMENT OF RIGHT KNEE, INITIAL ENCOUNTER: Primary | ICD-10-CM

## 2019-06-03 PROCEDURE — 97110 THERAPEUTIC EXERCISES: CPT | Performed by: PHYSICAL THERAPIST

## 2019-06-03 NOTE — THERAPY TREATMENT NOTE
Outpatient Physical Therapy Ortho Treatment Note  [facility]     Patient Name: Fazal Munoz  : 1990  MRN: [id number]  Today's Date: 6/3/2019      Visit Date: 2019    Visit Dx:    ICD-10-CM ICD-9-CM   1. Rupture of posterior cruciate ligament of right knee, initial encounter S83.521A 844.2       There is no problem list on file for this patient.       No past medical history on file.     Past Surgical History:   Procedure Laterality Date   • ARM LACERATION REPAIR Right    • EYE SURGERY         PT Ortho     Row Name 19 1500       Subjective Comments    Subjective Comments  Has been doing some basic skating without attempting any tricks/turns etc and going well. Reports that he fell down his last step at home a few days ago, had pn only temporarily and fully resolved. Denies any pn today.  -RB       Subjective Pain    Able to rate subjective pain?  yes  -RB    Pre-Treatment Pain Level  0  -RB    Post-Treatment Pain Level  0  -RB      User Key  (r) = Recorded By, (t) = Taken By, (c) = Cosigned By    Initials Name Provider Type    Silvia Duenas, PT Physical Therapist                      PT Assessment/Plan     Row Name 19 1763          PT Assessment    Assessment Comments  Pt did very well today w/ progression of SLS activities and introduction of low level plyometrics. He required cueing to correct hip adduction moment during double limb support squats, but improved w/ addition of TB at knees. He does note pressure in the R knee w/ deep squatting, so avoided for now and focused more on hip/knee control w/ shallow squatting.  -RB        PT Plan    PT Plan Comments  Cont to progress as pt tolerates  -RB       User Key  (r) = Recorded By, (t) = Taken By, (c) = Cosigned By    Initials Name Provider Type    Silvia Duenas, PT Physical Therapist            Exercises     Row Name 19 1500             Subjective Comments    Subjective Comments  Has been doing some basic skating  without attempting any tricks/turns etc and going well. Reports that he fell down his last step at home a few days ago, had pn only temporarily and fully resolved. Denies any pn today.  -RB         Subjective Pain    Able to rate subjective pain?  yes  -RB      Pre-Treatment Pain Level  0  -RB      Post-Treatment Pain Level  0  -RB         Total Minutes    36076 - PT Therapeutic Exercise Minutes  32  -RB         Exercise 1    Exercise Name 1  Continued ther ex in clinic per flow sheet. Progressed SLS activities and initiated low level plyometrics. Progressed to standing squats w/ TB around knees to cue hip abd after noting valgus moment.  -RB        User Key  (r) = Recorded By, (t) = Taken By, (c) = Cosigned By    Initials Name Provider Type    Silvia Duenas, PT Physical Therapist                       PT OP Goals     Row Name 06/03/19 1555          Time Calculation    PT Goal Re-Cert Due Date  08/21/19  -RB       User Key  (r) = Recorded By, (t) = Taken By, (c) = Cosigned By    Initials Name Provider Type    Silvia Duenas, PT Physical Therapist                         Time Calculation:   Start Time: 1515  Therapy Charges for Today     Code Description Service Date Service Provider Modifiers Qty    33774758452 HC PT THER PROC EA 15 MIN 6/3/2019 Silvia Kraft PT GP 2                    Silvia Kraft, PT  6/3/2019

## 2019-06-10 ENCOUNTER — TELEPHONE (OUTPATIENT)
Dept: ORTHOPEDIC SURGERY | Facility: CLINIC | Age: 29
End: 2019-06-10

## 2019-06-12 ENCOUNTER — OFFICE VISIT (OUTPATIENT)
Dept: ORTHOPEDIC SURGERY | Facility: CLINIC | Age: 29
End: 2019-06-12

## 2019-06-12 VITALS — OXYGEN SATURATION: 98 % | HEART RATE: 84 BPM | HEIGHT: 72 IN | WEIGHT: 158.73 LBS | BODY MASS INDEX: 21.5 KG/M2

## 2019-06-12 DIAGNOSIS — S83.521D RUPTURE OF POSTERIOR CRUCIATE LIGAMENT OF RIGHT KNEE, SUBSEQUENT ENCOUNTER: Primary | ICD-10-CM

## 2019-06-12 PROCEDURE — 99212 OFFICE O/P EST SF 10 MIN: CPT | Performed by: ORTHOPAEDIC SURGERY

## 2019-06-12 NOTE — PROGRESS NOTES
[facility] [place] [facility] Note    Subjective     Chief Complaint   Patient presents with   • Follow-up     1 month follow up - Rupture of posterior cruciate ligament of right knee        HPI  Fazal Munoz is a 29 y.o. male.  He says he is doing great follow-up right knee PCL injury from April 16.  He is been doing physical therapy.  He has been in a brace.  Pain is 0.    History reviewed. No pertinent past medical history.   Past Surgical History:   Procedure Laterality Date   • ARM LACERATION REPAIR Right    • EYE SURGERY        History reviewed. No pertinent family history.  Social History     Socioeconomic History   • Marital status: Single     Spouse name: Not on file   • Number of children: Not on file   • Years of education: Not on file   • Highest education level: Not on file   Tobacco Use   • Smoking status: Current Every Day Smoker     Packs/day: 1.00   Substance and Sexual Activity   • Alcohol use: Yes     Comment: rarely   • Drug use: Yes     Types: Marijuana     Comment: smoked today   • Sexual activity: Defer      No current outpatient medications on file prior to visit.     No current facility-administered medications on file prior to visit.       Allergies   Allergen Reactions   • Amoxicillin    • Ceclor [Cefaclor]    • Ciprofloxacin    • Penicillins         The following portions of the patient's history were reviewed and updated as appropriate: allergies, current medications, past family history, past medical history, past social history, past surgical history and problem list.    Review of Systems   Constitutional: Negative.    HENT: Negative.    Eyes: Negative.    Respiratory: Negative.    Cardiovascular: Negative.    Gastrointestinal: Negative.    Endocrine: Negative.    Genitourinary: Negative.    Musculoskeletal: Positive for arthralgias.   Skin: Negative.    Allergic/Immunologic: Negative.    Neurological: Negative.    Hematological: Negative.    Psychiatric/Behavioral: Negative.  "        Objective      Physical Exam  Pulse 84   Ht 182.9 cm (72.01\")   Wt 72 kg (158 lb 11.7 oz)   SpO2 98%   BMI 21.52 kg/m²     Body mass index is 21.52 kg/m².    GENERAL APPEARANCE: awake, alert & oriented x 3, in no acute distress and well developed, well nourished  PSYCH: normal mood and affect      Ortho Exam  Peripheral Vascular:    Upper Extremity:   Inspection:  Left--no cyanotic nail beds Right--no cyanotic nail beds   Bilateral:  Pink nail beds with brisk capillary refill   Palpation:  Bilateral radial pulse normal  Musculoskeletal:  Global Assessment:  Overall assessment of Lower Extremity Muscle Strength and Tone:  Right quadriceps--5/5  Right hamstrings--5/5  Right tibialis anterior--5/5  Right gastroc soleus--5/5  Right EHL--5/5  Lower Extremity:  Knee/Patella:  No digital clubbing or cyanosis.    Examination of right knee reveals:  Normal deep tendon reflexes, coordination, strength, tone, sensation.  No known fractures or deformities.  Inspection and Palpation:    Right knee:  Tenderness:  none  Effusion:  none  Crepitus:  none  Pulses:  2+  Ecchymosis:  None  Warmth:  None   ROM:  Right:  Extension:0    Flexion:135  Left:  Extension:0     Flexion:135  Instability:  Right:  Lachman Test:  Negative, Varus stress test negative,   Valgus stress test negative, Posterior Drawer Test: +1+ with an endpoint  Deformities/Malalignments/Discrepancies:    Left:  none  Right:  none  Functional Testing:  Right:  Mali's test:  Negative  Patella grind test:  Negative  Q-angle:  Normal  Apprehension Sign:  Negative        Imaging/Studies  Imaging Results (last 7 days)     ** No results found for the last 168 hours. **          Assessment/Plan        ICD-10-CM ICD-9-CM   1. Rupture of posterior cruciate ligament of right knee, subsequent encounter S83.521D V58.89     844.2     He has improvement in his right knee function.  He will continue physical therapy and his PCL brace for skating and strenuous " activity.  He will follow-up as needed.  All of his questions were answered.  He is doing very well.  Medical Decision Making  Management Options : over-the-counter medicine and physical/occupational therapy      Wilfredo Farris MD  06/12/19  10:31 AM         EMR Dragon/Transcription disclaimer:  Much of this encounter note is an electronic transcription of spoken language to printed text. Electronic transcription of spoken language may permit erroneous, or at times, nonsensical words or phrases to be inadvertently transcribed. Although I have reviewed the note for such errors, some may still exist.

## 2019-06-14 ENCOUNTER — HOSPITAL ENCOUNTER (OUTPATIENT)
Dept: PHYSICAL THERAPY | Facility: HOSPITAL | Age: 29
Setting detail: THERAPIES SERIES
Discharge: HOME | End: 2019-06-14

## 2019-06-14 DIAGNOSIS — S83.521A RUPTURE OF POSTERIOR CRUCIATE LIGAMENT OF RIGHT KNEE, INITIAL ENCOUNTER: Primary | ICD-10-CM

## 2019-06-14 PROCEDURE — 97110 THERAPEUTIC EXERCISES: CPT | Performed by: PHYSICAL THERAPIST

## 2019-06-14 NOTE — THERAPY PROGRESS REPORT/RE-CERT
Outpatient Physical Therapy Ortho Progress Note  [facility]     Patient Name: Fazal Munoz  : 1990  MRN: [id number]  Today's Date: 2019      Visit Date: 2019    Visit Dx:    ICD-10-CM ICD-9-CM   1. Rupture of posterior cruciate ligament of right knee, initial encounter S83.521A 844.2       There is no problem list on file for this patient.       No past medical history on file.     Past Surgical History:   Procedure Laterality Date   • ARM LACERATION REPAIR Right    • EYE SURGERY         PT Ortho     Row Name 19 1500       Subjective Comments    Subjective Comments  Pt denies any pn over past couple of weeks. Has returned to doing some skating tricks but being cautious and going slow, not haing any issue. Saw MD who released him from his care. Was issued knee brace for use w/ physical activity. Pt denies limitation w/ daily or recreational activities. Agreeable to d/c.  -RB       Subjective Pain    Able to rate subjective pain?  yes  -RB    Pre-Treatment Pain Level  0  -RB    Post-Treatment Pain Level  0  -RB       Posture/Observations    Posture/Observations Comments  Ambulates and navigates stairs w/o pn or deviation  -RB       Knee Palpation    Posterior Joint Line  -- non tender  -RB       Knee (Tibiofemoral) Accessory Motions    Posterior glide of tibia on femur  WNL  -RB       Right Lower Ext    Rt Knee Extension/Flexion AROM  0-150 deg w/o pn  -RB       MMT Right Lower Ext    Rt Hip Flexion MMT, Gross Movement  (5/5) normal  -RB    Rt Hip Extension MMT, Gross Movement  (5/5) normal  -RB    Rt Hip ABduction MMT, Gross Movement  (5/5) normal  -RB    Rt Knee Extension MMT, Gross Movement  (5/5) normal  -RB    Rt Knee Flexion MMT, Gross Movement  (5/5) normal  -RB      User Key  (r) = Recorded By, (t) = Taken By, (c) = Cosigned By    Initials Name Provider Type    Silvia Duenas PT Physical Therapist                      PT Assessment/Plan     Row Name 19 7078           PT Assessment    Assessment Comments  Pt has progressed well w/ PT. He has regained full knee ROM and strength. He is able to ambulate and navigate stairs w/o pn or instability. He has met all goals set for PT and has been cleared by MD. Pt appropriate for d/c.  -RB        PT Plan    PT Plan Comments  plan d/c if no issues arise  -RB       User Key  (r) = Recorded By, (t) = Taken By, (c) = Cosigned By    Initials Name Provider Type    Silvia Duenas PT Physical Therapist            Exercises     Row Name 06/14/19 1500             Subjective Comments    Subjective Comments  Pt denies any pn over past couple of weeks. Has returned to doing some skating tricks but being cautious and going slow, not haing any issue. Saw MD who released him from his care. Was issued knee brace for use w/ physical activity. Pt denies limitation w/ daily or recreational activities. Agreeable to d/c.  -RB         Subjective Pain    Able to rate subjective pain?  yes  -RB      Pre-Treatment Pain Level  0  -RB      Post-Treatment Pain Level  0  -RB         Total Minutes    [id number] - PT Therapeutic Exercise Minutes  20  -RB         Exercise 1    Exercise Name 1  Performed final reassessment, discussed final HEP  -RB        User Key  (r) = Recorded By, (t) = Taken By, (c) = Cosigned By    Initials Name Provider Type    Silvia Duenas PT Physical Therapist                       PT OP Goals     Row Name 06/14/19 1500          PT Short Term Goals    STG Date to Achieve  07/04/19  -RB     STG 1  Pt to be independent w/ long term HEP and self mgmt  -RB     STG 1 Progress  Met  -RB     STG 2  Pt to report no pn or limitations w/ work duties.  -RB     STG 2 Progress  Met  -RB     STG 3  Pt to demo R knee flxn ROM of 150 deg.  -RB     STG 3 Progress  Met  -RB     STG 4  Pt to perform single leg squat through full ROM w/o pn.  -RB     STG 4 Progress  Met  -RB     STG 5  Pt to improve LEFS score to 63/80 to reflect improved pn and function.   -RB     STG 5 Progress  Met  -RB       User Key  (r) = Recorded By, (t) = Taken By, (c) = Cosigned By    Initials Name Provider Type    Silvia Duenas, PT Physical Therapist               Outcome Measure Options: Lower Extremity Functional Scale (LEFS)  Lower Extremity Functional Index  Any of your usual work, housework or school activities: No difficulty  Your usual hobbies, recreational or sporting activities: A little bit of difficulty  Getting into or out of the bath: No difficulty  Walking between rooms: No difficulty  Putting on your shoes or socks: No difficulty  Squatting: A little bit of difficulty  Lifting an object, like a bag of groceries from the floor: No difficulty  Performing light activities around your home: No difficulty  Performing heavy activities around your home: No difficulty  Getting into or out of a car: No difficulty  Walking 2 blocks: No difficulty  Walking a mile: No difficulty  Going up or down 10 stairs (about 1 flight of stairs): No difficulty  Standing for 1 hour: No difficulty  Sitting for 1 hour: No difficulty  Running on even ground: A little bit of difficulty  Running on uneven ground: A little bit of difficulty  Making sharp turns while running fast: A little bit of difficulty  Hopping: A little bit of difficulty  Rolling over in bed: No difficulty  Total: 74      Time Calculation:   Start Time: 1545  Therapy Charges for Today     Code Description Service Date Service Provider Modifiers Qty    83352006505 HC PT THER PROC EA 15 MIN 6/14/2019 Silvia Kraft PT GP 1          PT G-Codes  Outcome Measure Options: Lower Extremity Functional Scale (LEFS)  Total: 74         Silvia Kraft PT  6/14/2019

## 2019-06-26 ENCOUNTER — DOCUMENTATION (OUTPATIENT)
Dept: PHYSICAL THERAPY | Facility: HOSPITAL | Age: 29
End: 2019-06-26

## 2019-06-26 DIAGNOSIS — S83.521A RUPTURE OF POSTERIOR CRUCIATE LIGAMENT OF RIGHT KNEE, INITIAL ENCOUNTER: Primary | ICD-10-CM

## 2019-06-26 NOTE — THERAPY DISCHARGE NOTE
Outpatient Physical Therapy Discharge Summary         Patient Name: Fazal Munoz  : 1990  MRN: [id number]    Today's Date: 2019    Visit Dx:    ICD-10-CM ICD-9-CM   1. Rupture of posterior cruciate ligament of right knee, initial encounter S83.521A 844.2       PT OP Goals     Row Name 19 0800          PT Short Term Goals    STG Date to Achieve  19  -RB     STG 1  Pt to be independent w/ long term HEP and self mgmt  -RB     STG 1 Progress  Met  -RB     STG 2  Pt to report no pn or limitations w/ work duties.  -RB     STG 2 Progress  Met  -RB     STG 3  Pt to demo R knee flxn ROM of 150 deg.  -RB     STG 3 Progress  Met  -RB     STG 4  Pt to perform single leg squat through full ROM w/o pn.  -RB     STG 4 Progress  Met  -RB     STG 5  Pt to improve LEFS score to 63/80 to reflect improved pn and function.  -RB     STG 5 Progress  Met  -RB       User Key  (r) = Recorded By, (t) = Taken By, (c) = Cosigned By    Initials Name Provider Type    Silvia Duenas PT Physical Therapist          OP PT Discharge Summary  Date of Discharge: 19  Reason for Discharge: All goals achieved  Outcomes Achieved: Able to achieve all goals within established timeline  Discharge Destination: Home with home program  Discharge Instructions/Additional Comments:  Pt has progressed well w/ PT. He has regained full knee ROM and strength. He is able to ambulate and navigate stairs w/o pn or instability. He has met all goals set for PT and has been cleared by MD. Pt appropriate for d/c      Time Calculation:                    Silvia Kraft, PT  2019